# Patient Record
Sex: FEMALE | Race: BLACK OR AFRICAN AMERICAN | NOT HISPANIC OR LATINO | URBAN - METROPOLITAN AREA
[De-identification: names, ages, dates, MRNs, and addresses within clinical notes are randomized per-mention and may not be internally consistent; named-entity substitution may affect disease eponyms.]

---

## 2024-04-13 ENCOUNTER — HOSPITAL ENCOUNTER (EMERGENCY)
Facility: HOSPITAL | Age: 79
End: 2024-04-14
Attending: STUDENT IN AN ORGANIZED HEALTH CARE EDUCATION/TRAINING PROGRAM
Payer: MEDICARE

## 2024-04-13 DIAGNOSIS — I46.9 CARDIAC ARREST (MULTI): Primary | ICD-10-CM

## 2024-04-13 PROCEDURE — 31500 INSERT EMERGENCY AIRWAY: CPT

## 2024-04-13 PROCEDURE — 94681 O2 UPTK CO2 OUTP % O2 XTRC: CPT | Mod: 59

## 2024-04-13 PROCEDURE — 92950 HEART/LUNG RESUSCITATION CPR: CPT

## 2024-04-13 PROCEDURE — 99285 EMERGENCY DEPT VISIT HI MDM: CPT | Mod: 25

## 2024-04-13 PROCEDURE — 31500 INSERT EMERGENCY AIRWAY: CPT | Performed by: STUDENT IN AN ORGANIZED HEALTH CARE EDUCATION/TRAINING PROGRAM

## 2024-04-13 PROCEDURE — 99285 EMERGENCY DEPT VISIT HI MDM: CPT | Performed by: STUDENT IN AN ORGANIZED HEALTH CARE EDUCATION/TRAINING PROGRAM

## 2024-04-13 PROCEDURE — 92950 HEART/LUNG RESUSCITATION CPR: CPT | Performed by: STUDENT IN AN ORGANIZED HEALTH CARE EDUCATION/TRAINING PROGRAM

## 2024-04-13 PROCEDURE — 2500000004 HC RX 250 GENERAL PHARMACY W/ HCPCS (ALT 636 FOR OP/ED): Performed by: STUDENT IN AN ORGANIZED HEALTH CARE EDUCATION/TRAINING PROGRAM

## 2024-04-13 RX ORDER — EPINEPHRINE 0.1 MG/ML
INJECTION INTRACARDIAC; INTRAVENOUS CODE/TRAUMA/SEDATION MEDICATION
Status: COMPLETED | OUTPATIENT
Start: 2024-04-13 | End: 2024-04-13

## 2024-04-13 RX ADMIN — EPINEPHRINE 1 MG: 0.1 INJECTION INTRAVENOUS at 22:30

## 2024-04-14 NOTE — ED PROVIDER NOTES
HPI   No chief complaint on file.      HPI:  Rosy Bunch is a 78 y.o. who is brought in by EMS in cardiac arrest. Per EMS report, patient had an unknown downtime, was found in [facility], it was a basic squad so they cannot administer medications, obtain IV access, or intubate.  Patient had BVM and Anil machine in place upon arrival.    Additional history was limited due to patient in cardiac arrest and no immediate family present    ROS:   Patient unable to provide due to clinical condition    EXAM:  - Gen: unconscious, nonresponsive to noxious stimuli  - HEENT: NCAT; pupils fixed/dilated; neck is soft/supple; MMM  - CV: Chest compressions via ANIL in progress, >2 sec cap refill bilateral upper extremities  - Pulm: +BVM with fair air exchange bilaterally, no spontaneous respirations  - GI: no bruising, no ecchymosis  - : not obtainable  - Extremities: no BLE edema  - Skin: warm, dry,   - Neuro: GCS 3, not obtainable    ED COURSE / MDM:   ACLS was continued here in the ED. Pt was bagged with ease., Endotracheal intubation performed with a 7.0 tube . ETT placement was confirmed colorimetry and end-tidal CO2. , A total of 2 rounds of ACLS were continued in the setting of prolonged downtime and already ACLS and route for 20 minutes. Patient received 1 mg of IO epinephrine. , Patient rhythm was asystole., No ROSC obtained., Pt was unresponsive to noxious stimuli, had no respirations, and eyes were fully dilated. Time of death was called at 2233. Family notified. Patient's primary care physician notified and will sign death certificate.    Clinical Impression:  -- Full cardiopulmonary arrest    Jean-Paul Orozco MD  Emergency Medicine, PGY 3                            No data recorded                   Patient History   No past medical history on file.  No past surgical history on file.  No family history on file.  Social History     Tobacco Use    Smoking status: Not on file    Smokeless tobacco: Not on file   Substance Use  Topics    Alcohol use: Not on file    Drug use: Not on file       Physical Exam   ED Triage Vitals   Temp Pulse Resp BP   -- -- -- --      SpO2 Temp src Heart Rate Source Patient Position   -- -- -- --      BP Location FiO2 (%)     -- --       Physical Exam    ED Course & MDM   Diagnoses as of 04/13/24 7568   Cardiac arrest (Multi)       Medical Decision Making      Procedure  Procedures     Jean-Paul Saldana MD  Resident  04/13/24 1592

## 2024-04-14 NOTE — ED PROCEDURE NOTE
Procedure  Intubation    Performed by: Eulalio Almendarez DO  Authorized by: Rosy Smiley DO    Consent:     Consent obtained:  Emergent situation  Universal protocol:     Patient identity confirmed:  Arm band  Pre-procedure details:     Indications: cardio/pulmonary arrest      Patient status:  Unresponsive    Look externally: no concerns      Mouth opening - incisor distance:  2 finger widths    Obstruction: none      Pharmacologic strategy: none      Induction agents:  None    Paralytics:  None  Procedure details:     Preoxygenation:  Bag valve mask    CPR in progress: no      Number of attempts:  1  Successful intubation attempt details:     Intubation method:  Oral    Intubation technique: video assisted      Laryngoscope blade:  Mac 3    Bougie used: no      Grade view: IV      Tube size (mm):  7.0    Tube type:  Cuffed    Tube visualized through cords: yes    Placement assessment:     ETT at teeth/gumline (cm):  23    Tube secured with:  ETT stone    Placement verification: direct visualization    Post-procedure details:     Procedure completion:  Tolerated well, no immediate complications               Eulalio Almendarez DO  Resident  04/13/24 [id number]

## 2024-04-14 NOTE — ED TRIAGE NOTES
Patient brought in by EMS for cardiac arrest, per EMS, patients son called EMS due to patient being unresponsive. EMS could not obtain access, started continuous CPR with lucus. Patient arrived to ED on [facility] with continuous CPR and being bagged with BVM